# Patient Record
Sex: FEMALE | Race: OTHER | HISPANIC OR LATINO | URBAN - METROPOLITAN AREA
[De-identification: names, ages, dates, MRNs, and addresses within clinical notes are randomized per-mention and may not be internally consistent; named-entity substitution may affect disease eponyms.]

---

## 2022-09-24 ENCOUNTER — EMERGENCY (EMERGENCY)
Facility: HOSPITAL | Age: 42
LOS: 1 days | Discharge: HOME | End: 2022-09-24
Attending: EMERGENCY MEDICINE
Payer: MEDICAID

## 2022-09-24 VITALS
TEMPERATURE: 98 F | DIASTOLIC BLOOD PRESSURE: 75 MMHG | RESPIRATION RATE: 16 BRPM | WEIGHT: 162.04 LBS | OXYGEN SATURATION: 100 % | SYSTOLIC BLOOD PRESSURE: 117 MMHG | HEIGHT: 64 IN | HEART RATE: 75 BPM

## 2022-09-24 PROCEDURE — 73630 X-RAY EXAM OF FOOT: CPT

## 2022-09-24 PROCEDURE — 70450 CT HEAD/BRAIN W/O DYE: CPT | Mod: MA

## 2022-09-24 PROCEDURE — 70450 CT HEAD/BRAIN W/O DYE: CPT | Mod: 26,MA

## 2022-09-24 PROCEDURE — 96374 THER/PROPH/DIAG INJ IV PUSH: CPT

## 2022-09-24 PROCEDURE — 73610 X-RAY EXAM OF ANKLE: CPT

## 2022-09-24 PROCEDURE — 73562 X-RAY EXAM OF KNEE 3: CPT

## 2022-09-24 PROCEDURE — 99284 EMERGENCY DEPT VISIT MOD MDM: CPT

## 2022-09-24 PROCEDURE — 99284 EMERGENCY DEPT VISIT MOD MDM: CPT | Mod: 25

## 2022-09-24 PROCEDURE — 72125 CT NECK SPINE W/O DYE: CPT | Mod: 26,MA

## 2022-09-24 PROCEDURE — 72125 CT NECK SPINE W/O DYE: CPT | Mod: MA

## 2022-09-24 RX ORDER — LIDOCAINE 4 G/100G
2 CREAM TOPICAL ONCE
Refills: 0 | Status: COMPLETED | OUTPATIENT
Start: 2022-09-24 | End: 2022-09-24

## 2022-09-24 RX ORDER — KETOROLAC TROMETHAMINE 30 MG/ML
15 SYRINGE (ML) INJECTION ONCE
Refills: 0 | Status: DISCONTINUED | OUTPATIENT
Start: 2022-09-24 | End: 2022-09-24

## 2022-09-24 RX ORDER — DIAZEPAM 5 MG
5 TABLET ORAL ONCE
Refills: 0 | Status: COMPLETED | OUTPATIENT
Start: 2022-09-24 | End: 2022-09-24

## 2022-09-24 RX ORDER — OXYCODONE AND ACETAMINOPHEN 5; 325 MG/1; MG/1
1 TABLET ORAL ONCE
Refills: 0 | Status: DISCONTINUED | OUTPATIENT
Start: 2022-09-24 | End: 2022-09-24

## 2022-09-24 RX ADMIN — Medication 15 MILLIGRAM(S): at 21:51

## 2022-09-24 RX ADMIN — OXYCODONE AND ACETAMINOPHEN 1 TABLET(S): 5; 325 TABLET ORAL at 21:51

## 2022-09-24 RX ADMIN — LIDOCAINE 2 PATCH: 4 CREAM TOPICAL at 21:51

## 2022-09-24 NOTE — ED ADULT NURSE NOTE - NSIMPLEMENTINTERV_GEN_ALL_ED
Implemented All Universal Safety Interventions:  [place] to call system. Call bell, personal items and telephone within reach. Instruct patient to call for assistance. Room bathroom lighting operational. Non-slip footwear when patient is off stretcher. Physically safe environment: no spills, clutter or unnecessary equipment. Stretcher in lowest position, wheels locked, appropriate side rails in place.

## 2022-09-24 NOTE — ED ADULT TRIAGE NOTE - CHIEF COMPLAINT QUOTE
biba from the street with c/o pain to the left knee and generalized body part , s/p tripped and fell  in the side walk

## 2022-09-25 VITALS
OXYGEN SATURATION: 98 % | TEMPERATURE: 98 F | SYSTOLIC BLOOD PRESSURE: 107 MMHG | DIASTOLIC BLOOD PRESSURE: 68 MMHG | RESPIRATION RATE: 18 BRPM | HEART RATE: 63 BPM

## 2022-09-25 PROCEDURE — 73562 X-RAY EXAM OF KNEE 3: CPT | Mod: 26,LT

## 2022-09-25 PROCEDURE — 73630 X-RAY EXAM OF FOOT: CPT | Mod: 26,LT

## 2022-09-25 PROCEDURE — 73610 X-RAY EXAM OF ANKLE: CPT | Mod: 26,LT

## 2022-09-25 NOTE — ED PROVIDER NOTE - PATIENT PORTAL LINK FT
You can access the FollowMyHealth Patient Portal offered by [facility] by registering at the following website: [email or web address]. By joining [facility]’s FollowMyHealth portal, you will also be able to view your health information using other applications (apps) compatible with our system.

## 2022-09-25 NOTE — ED PROVIDER NOTE - NSFOLLOWUPINSTRUCTIONS_ED_ALL_ED_FT
1. Alexa ibuprofen 600 mg cada 4-6 horas para dolor    Dolor rufus de rodilla en los adultos    Acute Knee Pain, Adult      El dolor rufus de rodilla es repentino y puede deberse a daño, hinchazón o irritación de los músculos y tejidos que sostienen la rodilla. El dolor puede ser consecuencia de lo siguiente:  •Patsy caída.      •Patsy lesión en la rodilla debido a movimientos de rotación.      •Un golpe en la rodilla.      •Patsy infección.      El dolor rufus de rodilla puede desaparecer solo con el tiempo y el descanso. De no ser así, méndez médico podría indicarle que se tari estudios para hallar la causa del dolor. Pueden incluir:  •Estudios de diagnóstico por imágenes, tram patsy radiografía, patsy resonancia magnética (RM), patsy exploración por tomografía computarizada (TC) o patsy ecografía.      •Aspiración de patsy articulación. En carolina estudio, se extrae líquido de la rodilla y se evalúa.      •Artroscopia. En carolina estudio, se introduce un tubo iluminado en la rodilla y se proyecta patsy imagen en patsy pantalla de televisión.      •Biopsia. En carolina estudio, se alexa patsy muestra de tejido del organismo y se la estudia con un microscopio.        Siga estas instrucciones en méndez casa:      Si tiene patsy rodillera o un dispositivo ortopédico:      •Use la rodillera o el dispositivo ortopédico tram se lo haya indicado el médico. Quíteselos solamente tram se lo haya indicado el médico.      •Aflójelos si siente hormigueo en los dedos del pie, se le adormecen o se le enfrían y se tornan azulados.      •Manténgalos limpios.    •Si la rodillera o el dispositivo ortopédico no son impermeables:  •No deje que se mojen.      •Cúbralos con un envoltorio hermético cuando tome un baño de inmersión o patsy ducha.        Actividad     •Descanse la rodilla.      • No tari cosas que le causen dolor o que lo intensifiquen.      •Evite las actividades o los ejercicios de alto impacto, tram correr, saltar la soga o hacer kodi de tijera.      •Trabaje con un fisioterapeuta para crear un programa de ejercicios seguros, según lo recomiende el médico. Tari ejercicios tram se lo haya indicado el fisioterapeuta.        Control del dolor, la rigidez y la hinchazón    •Si se lo indican, aplique hielo sobre la rodilla afectada. Para hacer esto:  •Si usa patsy rodillera o un dispositivo ortopédico desmontables, quíteselos según las indicaciones del médico.      •Ponga el hielo en patsy bolsa plástica.      •Coloque patsy toalla entre la piel y la bolsa.      •Aplique el hielo jackson 20 minutos, 2 o 3 veces por día.      •Retire el hielo si la piel se pone de color low brillante. Dwight es muy importante. Si no puede sentir dolor, calor o frío, tiene un mayor riesgo de que se dañe la chio.        •Si se lo indican, use patsy venda elástica para ejercer presión (compresión) en la rodilla lesionada. [place]ight puede controlar la hinchazón, darle sostén y ayudar a aliviar las molestias.      •Cuando esté sentado o acostado, levante (eleve) la rodilla por encima del nivel del corazón.      •Duerma con patsy almohada debajo de la rodilla.      Indicaciones generales     •Use los medicamentos de venta chirag y los recetados solamente tram se lo haya indicado el médico.      • No consuma ningún producto que contenga nicotina o tabaco, tram cigarrillos, cigarrillos electrónicos y tabaco de mascar. Si necesita ayuda para dejar de consumir estos productos, consulte al médico.      •Si tiene sobrepeso, trabaje con el médico y un nutricionista para establecer patsy meta de pérdida de peso que sea saludable y razonable para usted. El exceso de peso puede generar presión en la rodilla.      •Esté atento a cualquier cambio en los síntomas.      •Cumpla con todas las visitas de seguimiento. [place]ight es importante.        Comuníquese con un médico si:    •El dolor de rodilla continúa, cambia o empeora.      •Tiene fiebre junto con dolor de rodilla.      •La rodilla se siente caliente al tacto o está enrojecida.      •La rodilla se le tuerce o se le traba.        Solicite ayuda de inmediato si:    •La rodilla se hincha, y la hinchazón empeora.      •No puede  la rodilla.      •Tiene un dolor intenso en la rodilla que no se puede controlar con analgésicos.        Resumen    •El dolor rufus de rodilla puede deberse a patsy caída, patsy lesión, patsy infección o a daño, hinchazón o irritación de los tejidos que sostienen la rodilla.      •El médico podría hacerle estudios para hallar la causa del dolor.      •Esté atento a cualquier cambio en los síntomas. Alivie el dolor con descanso, medicamentos, actividad de poca intensidad y el uso de hielo.      •Obtenga ayuda de inmediato si la rodilla se hincha, no puede  la rodilla o tiene un dolor intenso que no se puede controlar con medicamentos.      Esta información no tiene tram fin reemplazar el consejo del médico. Asegúrese de hacerle al médico cualquier pregunta que tenga.

## 2022-09-25 NOTE — ED PROVIDER NOTE - PHYSICAL EXAMINATION
General: Well appearing, no acute distress, appears stated age  HEENT: normocephalic, atraumatic   Respiratory: normal work of breathing  MSK: tenderness to c spine, right knee, right lateral malleolus, no swelling or tenderness of lower extremities, moving all extremities spontaneously   Skin: warm, dry  Neuro: A&Ox3, cranial nerves II-XII intact, 5/5 strength in all extremities, no sensory deficits, normal gait   Psych: appropriate affect

## 2022-09-25 NOTE — ED PROVIDER NOTE - OBJECTIVE STATEMENT
41-year-old female presents status post trip and fall with neck pain right knee pain and right foot and ankle pain.  Patient is on a blood thinners.  She denies hitting her head or losing consciousness nausea lightheadedness or confusion.  She does have a headache and pain diffusely.
